# Patient Record
Sex: FEMALE | Race: WHITE | NOT HISPANIC OR LATINO | Employment: UNEMPLOYED | URBAN - METROPOLITAN AREA
[De-identification: names, ages, dates, MRNs, and addresses within clinical notes are randomized per-mention and may not be internally consistent; named-entity substitution may affect disease eponyms.]

---
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
## 2023-09-13 PROCEDURE — 87081 CULTURE SCREEN ONLY: CPT

## 2025-06-30 ENCOUNTER — OFFICE VISIT (OUTPATIENT)
Dept: FAMILY MEDICINE CLINIC | Facility: CLINIC | Age: 20
End: 2025-06-30
Payer: COMMERCIAL

## 2025-06-30 VITALS
SYSTOLIC BLOOD PRESSURE: 120 MMHG | DIASTOLIC BLOOD PRESSURE: 72 MMHG | HEIGHT: 63 IN | WEIGHT: 199.9 LBS | OXYGEN SATURATION: 100 % | BODY MASS INDEX: 35.42 KG/M2 | HEART RATE: 68 BPM | TEMPERATURE: 96.6 F

## 2025-06-30 DIAGNOSIS — N92.6 IRREGULAR MENSTRUAL BLEEDING: ICD-10-CM

## 2025-06-30 DIAGNOSIS — Z11.1 VISIT FOR TB SKIN TEST: ICD-10-CM

## 2025-06-30 DIAGNOSIS — Z00.00 ANNUAL PHYSICAL EXAM: Primary | ICD-10-CM

## 2025-06-30 DIAGNOSIS — Z12.4 ENCOUNTER FOR PAPANICOLAOU SMEAR OF CERVIX: ICD-10-CM

## 2025-06-30 DIAGNOSIS — L21.9 SEBORRHEIC DERMATITIS OF SCALP: ICD-10-CM

## 2025-06-30 DIAGNOSIS — Z23 NEED FOR TDAP VACCINATION: ICD-10-CM

## 2025-06-30 PROCEDURE — 86580 TB INTRADERMAL TEST: CPT | Performed by: NURSE PRACTITIONER

## 2025-06-30 PROCEDURE — 90471 IMMUNIZATION ADMIN: CPT | Performed by: NURSE PRACTITIONER

## 2025-06-30 PROCEDURE — 99213 OFFICE O/P EST LOW 20 MIN: CPT | Performed by: NURSE PRACTITIONER

## 2025-06-30 PROCEDURE — 99395 PREV VISIT EST AGE 18-39: CPT | Performed by: NURSE PRACTITIONER

## 2025-06-30 PROCEDURE — 90715 TDAP VACCINE 7 YRS/> IM: CPT | Performed by: NURSE PRACTITIONER

## 2025-06-30 RX ORDER — KETOCONAZOLE 20 MG/ML
SHAMPOO, SUSPENSION TOPICAL 2 TIMES WEEKLY
Qty: 120 ML | Refills: 2 | Status: SHIPPED | OUTPATIENT
Start: 2025-06-30

## 2025-06-30 RX ORDER — CLOBETASOL PROPIONATE 0.5 MG/G
1 CREAM TOPICAL 2 TIMES DAILY
Qty: 30 G | Refills: 2 | Status: SHIPPED | OUTPATIENT
Start: 2025-06-30

## 2025-06-30 NOTE — PROGRESS NOTES
"  ENCOUNTER DATE:  06/30/2025    Skyler Perez / 19 y.o. / female      CHIEF COMPLAINT     Establish Care and TB Test      VITALS     Visit Vitals  /72   Pulse 68   Temp 96.6 °F (35.9 °C) (Temporal)   Ht 160 cm (63\")   Wt 90.7 kg (199 lb 14.4 oz)   SpO2 100%   BMI 35.41 kg/m²       BP Readings from Last 3 Encounters:   06/30/25 120/72   09/13/23 121/67     Wt Readings from Last 3 Encounters:   06/30/25 90.7 kg (199 lb 14.4 oz)   09/13/23 84.8 kg (187 lb) (96%, Z= 1.72)*     * Growth percentiles are based on CDC (Girls, 2-20 Years) data.      Body mass index is 35.41 kg/m².    MEDICATIONS     No current outpatient medications on file prior to visit.     No current facility-administered medications on file prior to visit.         HISTORY OF PRESENT ILLNESS     Skyler presents for annual health maintenance visit.  No results found for: \"HPVAPTIMA\"   Last health maintenance visit: approximately 3 years ago  General health: good  Lifestyle:  Attempting to lose weight?: No   Diet: eats moderately healthy  Exercise: generally stays active (but no regular exercise)  Tobacco: Never used   Alcohol: never  Work: Part-time and Student  Sees Gynecologist?: Yes   Barb/Postmenopausal?: No   Domestic abuse concerns: No   Depression Screening:          PHQ-9 Depression Screening  Little interest or pleasure in doing things? Not at all   Feeling down, depressed, or hopeless? Not at all   PHQ-2 Total Score 0   Trouble falling or staying asleep, or sleeping too much?     Feeling tired or having little energy?     Poor appetite or overeating?     Feeling bad about yourself - or that you are a failure or have let yourself or your family down?     Trouble concentrating on things, such as reading the newspaper or watching television?     Moving or speaking so slowly that other people could have noticed? Or the opposite - being so fidgety or restless that you have been moving around a lot more than usual?     Thoughts that you would " be better off dead, or of hurting yourself in some way?     PHQ-9 Total Score     If you checked off any problems, how difficult have these problems made it for you to do your work, take care of things at home, or get along with other people? Not difficult at all       ORTIZ-7  Feeling nervous, anxious or on edge: Not at all  Not being able to stop or control worrying: Not at all  Worrying too much about different things: Not at all  Trouble Relaxing: Not at all  Being so restless that it is hard to sit still: Not at all  Feeling afraid as if something awful might happen: Not at all  Becoming easily annoyed or irritable: Not at all  ORTIZ 7 Total Score: 0  If you checked any problems, how difficult have these problems made it for you to do your work, take care of things at home, or get along with other people: Not difficult at all        Patient Care Team:  Kamari Whipple APRN as PCP - General (Nurse Practitioner)      ALLERGIES  No Known Allergies     PFSH:     The following portions of the patient's history were reviewed and updated as appropriate: Allergies / Current Medications / Past Medical History / Surgical History / Social History / Family History    PROBLEM LIST   There is no problem list on file for this patient.      PAST MEDICAL HISTORY  History reviewed. No pertinent past medical history.    SURGICAL HISTORY  History reviewed. No pertinent surgical history.    SOCIAL HISTORY  Social History     Socioeconomic History    Marital status: Single   Tobacco Use    Smoking status: Never    Smokeless tobacco: Never   Vaping Use    Vaping status: Never Used   Substance and Sexual Activity    Alcohol use: Not Currently    Drug use: Never    Sexual activity: Yes     Partners: Male     Birth control/protection: None       FAMILY HISTORY  Family History   Problem Relation Age of Onset    Diabetes Paternal Grandfather        IMMUNIZATION HISTORY  Immunization History   Administered Date(s) Administered     COVID-19 (PFIZER) Purple Cap Monovalent 12/02/2021, 01/03/2022    Fluzone  >6mos 10/25/2024    PPD Test 06/30/2025    Tdap 06/30/2025         HPI    History of Present Illness  The patient presents for evaluation of irregular periods and scalp issues.    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    She plans to schedule an appointment with her OB/GYN, as she has not had a consultation in several years. She had her Nexplanon removed in 2024 and experiences irregular menstrual cycles, leading her to suspect a possible diagnosis of PCOS. She has only had one Pap smear, which was conducted when she was 16 years old.    She has been experiencing scalp breakouts since childhood, which have recently extended to the area behind her ear. She is uncertain if this could be psoriasis. She also reports the presence of dandruff on the back of her neck. She has been using Nizoral shampoo, but it has not provided significant relief. She applies it every other day or as needed. She used T-Gel in her younger years, which was effective, but she has not tried it recently.    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         Physical Exam  Vitals reviewed.   Constitutional:       Appearance: Normal appearance. She is well-developed.   HENT:      Head: Normocephalic and atraumatic.   Eyes:      Conjunctiva/sclera: Conjunctivae normal.      Pupils: Pupils are equal, round, and reactive to light.   Cardiovascular:      Rate and Rhythm: Normal rate and regular rhythm.      Heart sounds: No murmur heard.  Pulmonary:      Effort: Pulmonary effort is normal.      Breath sounds: Normal breath sounds. No wheezing.   Skin:     General: Skin is warm and dry.  "  Neurological:      Mental Status: She is alert and oriented to person, place, and time.   Psychiatric:         Mood and Affect: Mood and affect normal.         Behavior: Behavior normal.         Thought Content: Thought content normal.         Judgment: Judgment normal.         REVIEWED DATA      Labs:    No results found for: \"NA\", \"K\", \"CALCIUM\", \"AST\", \"ALT\", \"BUN\", \"CREATININE\", \"EGFRIFNONA\", \"EGFRIFAFRI\"    No results found for: \"GLU\", \"HGBA1C\", \"TSH\", \"FREET4\"    No results found for: \"LDL\", \"HDL\", \"TRIG\", \"CHOLHDLRATIO\"    No results found for: \"ZCQA15JO\"     No results found for: \"WBC\", \"HGB\", \"MCV\", \"PLT\"    No results found for: \"PROTEIN\", \"GLUCOSEU\", \"BLOODU\", \"NITRITEU\", \"LEUKOCYTESUR\"     No results found for: \"HEPCVIRUSABY\"        ASSESSMENT & PLAN     Diagnoses and all orders for this visit:    1. Annual physical exam (Primary)    2. Visit for TB skin test  -     TB Skin Test    3. Need for Tdap vaccination  -     Tdap Vaccine => 8yo IM (BOOSTRIX/ADACEL)    4. Encounter for Papanicolaou smear of cervix  -     Ambulatory Referral to Obstetrics / Gynecology    5. Irregular menstrual bleeding  -     Ambulatory Referral to Obstetrics / Gynecology    6. Seborrheic dermatitis of scalp    Other orders  -     ketoconazole (NIZORAL) 2 % shampoo; Apply  topically to the appropriate area as directed 2 (Two) Times a Week.  Dispense: 120 mL; Refill: 2  -     clobetasol propionate (TEMOVATE) 0.05 % cream; Apply 1 Application topically to the appropriate area as directed 2 (Two) Times a Day.  Dispense: 30 g; Refill: 2        Assessment & Plan  1. Health maintenance.  - TB skin test will be administered today, with the first reading scheduled for Wednesday. The second test will be conducted on the same day, with the reading to be done between 7 to 21 days later.  - Tdap vaccine will also be administered today.  - No other immediate health concerns reported.    2. Irregular periods.  - Reports irregular periods and " concerns about PCOS.  - Referral to Dr. Mendosa for a Pap smear and further evaluation of her irregular periods will be made.  - No current use of Nexplanon, which was removed in 2024.    3. Seborrheic dermatitis.  - Experiencing scalp breakouts and dandruff, which have recently worsened and spread behind the ears and on the back of the neck.  - Physical exam reveals seborrheic dermatitis on the scalp, behind the ears, and on the back of the neck.  - Prescription for ketoconazole shampoo to be used twice weekly and clobetasol cream to be applied twice daily until symptoms subside, then as needed.  - Advised to inform if there is no improvement within a few weeks for consideration of a stronger shampoo.         HEALTHCARE MAINTENANCE ISSUES     Cancer Screening:  Colon: Initial/Next screening at age: 43  Repeat colon cancer screening: N/A at this time  Breast: Recommended monthly self exams; annual professional exam  Mammogram: N/A at this time  Cervical: pelvic exam as recommended by gyne  Skin: Monthly self skin examination, annual exam by health professional      Lifestyle Counseling:  Lifestyle Modifications: Continue good lifestyle choices/modifications  Safety Issues: Always wear seatbelt, Avoid texting while driving   Use sunscreen, regular skin examination  Recommended annual dental/vision examination.  Emotional/Stress/Sleep: Reviewed and  given when appropriate    Part of this note may be electronic transcription/translation of spoken language to printed text using the Dragon dictation system      Patient or patient representative verbalized consent for the use of Ambient Listening during the visit with  RAMSES Gabriel for chart documentation. 6/30/2025  10:58 EDT

## 2025-07-02 LAB
INDURATION: 0 MM (ref 0–10)
Lab: NORMAL
Lab: NORMAL
TB SKIN TEST: NORMAL

## 2025-07-07 ENCOUNTER — CLINICAL SUPPORT (OUTPATIENT)
Dept: FAMILY MEDICINE CLINIC | Facility: CLINIC | Age: 20
End: 2025-07-07
Payer: COMMERCIAL

## 2025-07-07 DIAGNOSIS — Z11.1 VISIT FOR TB SKIN TEST: Primary | ICD-10-CM

## 2025-07-07 PROCEDURE — 86580 TB INTRADERMAL TEST: CPT | Performed by: NURSE PRACTITIONER

## 2025-07-14 ENCOUNTER — CLINICAL SUPPORT (OUTPATIENT)
Dept: FAMILY MEDICINE CLINIC | Facility: CLINIC | Age: 20
End: 2025-07-14
Payer: COMMERCIAL

## 2025-07-14 DIAGNOSIS — Z11.1 VISIT FOR TB SKIN TEST: Primary | ICD-10-CM

## 2025-07-14 PROCEDURE — 86580 TB INTRADERMAL TEST: CPT | Performed by: NURSE PRACTITIONER

## 2025-07-17 LAB
INDURATION: 0 MM (ref 0–10)
Lab: NORMAL
Lab: NORMAL
TB SKIN TEST: NORMAL

## 2025-07-23 ENCOUNTER — TELEPHONE (OUTPATIENT)
Dept: FAMILY MEDICINE CLINIC | Facility: CLINIC | Age: 20
End: 2025-07-23
Payer: COMMERCIAL

## 2025-07-23 NOTE — TELEPHONE ENCOUNTER
Caller: Skyler Perez    Relationship to patient: Self    Best call back number: [phone]    Patient is needing: Patient called in and is requesting a call back to see if she can have varicella vaccine at office and would like to know how long she would need to wait for the second shot.

## 2025-07-24 NOTE — TELEPHONE ENCOUNTER
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 Please send order to [facility] on chart. She is requesting another provider to order before Kamari gets back.     [phone] to let her know if order is sent.